# Patient Record
Sex: FEMALE | Race: WHITE | HISPANIC OR LATINO | Employment: OTHER | URBAN - METROPOLITAN AREA
[De-identification: names, ages, dates, MRNs, and addresses within clinical notes are randomized per-mention and may not be internally consistent; named-entity substitution may affect disease eponyms.]

---

## 2022-07-31 ENCOUNTER — HOSPITAL ENCOUNTER (EMERGENCY)
Facility: CLINIC | Age: 42
Discharge: HOME | End: 2022-07-31
Attending: EMERGENCY MEDICINE | Admitting: EMERGENCY MEDICINE
Payer: MEDICARE

## 2022-07-31 VITALS
DIASTOLIC BLOOD PRESSURE: 101 MMHG | OXYGEN SATURATION: 100 % | RESPIRATION RATE: 14 BRPM | HEART RATE: 98 BPM | TEMPERATURE: 97.6 F | SYSTOLIC BLOOD PRESSURE: 147 MMHG

## 2022-07-31 DIAGNOSIS — Z86.69 HISTORY OF EPILEPSY: ICD-10-CM

## 2022-07-31 DIAGNOSIS — R56.9 SEIZURE (H): ICD-10-CM

## 2022-07-31 PROCEDURE — 250N000013 HC RX MED GY IP 250 OP 250 PS 637: Performed by: EMERGENCY MEDICINE

## 2022-07-31 PROCEDURE — 99283 EMERGENCY DEPT VISIT LOW MDM: CPT

## 2022-07-31 RX ORDER — LEVETIRACETAM 500 MG/1
2000 TABLET ORAL ONCE
Status: DISCONTINUED | OUTPATIENT
Start: 2022-07-31 | End: 2022-07-31

## 2022-07-31 RX ORDER — ZONISAMIDE 100 MG/1
300 CAPSULE ORAL ONCE
Status: COMPLETED | OUTPATIENT
Start: 2022-07-31 | End: 2022-07-31

## 2022-07-31 RX ORDER — LEVETIRACETAM 500 MG/1
1000 TABLET ORAL ONCE
Status: COMPLETED | OUTPATIENT
Start: 2022-07-31 | End: 2022-07-31

## 2022-07-31 RX ADMIN — LEVETIRACETAM 1000 MG: 500 TABLET, FILM COATED ORAL at 00:55

## 2022-07-31 RX ADMIN — ZONISAMIDE 300 MG: 100 CAPSULE ORAL at 00:56

## 2022-07-31 NOTE — ED TRIAGE NOTES
[place], per EMS report patient found to be having seizure like activity in parking lot. EMS reports that pt was postictal on scene. Pt has seizure history. BS 90. VSS on route.

## 2022-07-31 NOTE — ED PROVIDER NOTES
History   Chief Complaint:  Seizure     The history is provided by the patient, the EMS personnel and medical records.      Ev Ricks is a 42 year old female with history of kidney disease, kidney stones, epilepsy, COVID-19 infection, migraine, and reactive airway disease who presents with seizure. EMS report the patient was found having seizure-like activity in the airport parking ramp tonight. The patient was reportedly coming back from [place]. The patient reports she was visiting her son. She was reportedly tachycardic and had a blood sugar of 90 mg/dl en route. The patient states her first seizure occurred when she was 6 or 7 years old and states her last was about a year ago.  She states she is on Keppra and Zonisamide to control her seizures. She reports she has not missed any doses, but has not taken her nightly dose of Keppra yet as she was flying back home to [place].  The patient reports she has a history of kidney stones. She notes she had her ovaries removed. The patient reports she lives with her 11 and 12 year old children. She states she does drink alcohol but only on rare occasions.  The patient reports she has no fever, cough, or other symptoms. She states she feels okay at this time and would like to go home.     Review of Systems   All other systems reviewed and are negative.      Allergies:  Prednisone    Medications:  Keppra   Zonisamide  Diastat  Albuterol inhaler  Flomax  Flovent inhaler  DSS  Zofran  Fioricet    Past Medical History:     Kidney stones  Chronic migraine  Reactive airway disease  Generalized convulsive epilepsy  Kidney disease  COVID-19 infection    Past Surgical History:    Right shoulder surgery  Cholecystectomy   Bilateral kidney surgery  Partial hysterectomy  BSO  Skin graft  Breast reduction  Lithotripsy  Ureteral stent placement    Social History:  The patient presents to the ED alone  PCP: Dorina Manrique MD  [facility] is where she  "goes for primary care  The patient reports she lives in [place] with her 11 and 12 year old children    Physical Exam     Patient Vitals for the past 24 hrs:   BP Temp Temp src Pulse Resp SpO2   07/31/22 0100 -- -- -- -- -- 100 %   07/31/22 0045 -- -- -- -- -- 100 %   07/31/22 0030 -- -- -- -- 14 100 %   07/31/22 0025 -- -- -- -- 21 100 %   07/31/22 0020 -- 97.6  F (36.4  C) Temporal -- (!) 32 100 %   07/31/22 0015 (!) 147/101 97.6  F (36.4  C) Temporal 98 18 100 %     Physical Exam  General: Nontoxic-appearing woman sitting upright in room 7  HENT: mucous membranes moist, no tongue injury, face and skull nontender  CV: rate as above, regular rhythm, no murmur audible  Resp: normal effort, speaks in full phrases, no stridor, no cough observed  GI: abdomen soft and nontender, no guarding  MSK: no bony tenderness  Skin: appropriately warm and dry  Neuro: awake, alert, clear speech, fully oriented, face symmetric,  normal, strength and sensation intact in all extr, no nuchal rigidity, ambulatory  Psych: cooperative, no apparent hallucinations    Emergency Department Course     Emergency Department Course:     Reviewed:  I reviewed nursing notes, vitals, past medical history and Care Everywhere    Assessments:  0013 I obtained history and examined the patient as noted above.     Interventions:  Medications   levETIRAcetam (KEPPRA) tablet 1,000 mg (1,000 mg Oral Given 7/31/22 0055)   zonisamide (ZONEGRAN) capsule 300 mg (300 mg Oral Given 7/31/22 0056)     Disposition:  The patient was discharged to home.     Impression & Plan   Medical Decision Making:  Unfortunately do not have access to any detailed description of what a bystander saw, the paramedics state they were simply told she was having \"seizure activity\", which did resolve without pharmacologic intervention after which she was confused and this steadily improved.  This is certainly highly suggestive of a true seizure, and she does have a seizure " disorder.  Fortunately, she has recovered back to her baseline and feels well.  A dose of her oral antiepileptic medication was provided here in the ED.  She not had any recurrent seizure activity.  No signs of intoxication or withdrawal state.  No focal neurologic signs or symptoms.  She requested discharge which I did feel was reasonable.  She should not drive in the near term until cleared by her neurologist.  In the absence of preceding or subsequent or persistent symptoms, I do not think that further emergent work-up such as blood tests is absolutely necessary, and she wished to minimize any diagnostic work-up.  Highly doubt meningitis or intracranial hemorrhage or tumor.  Return for sudden worsening at any hour.    Diagnosis:    ICD-10-CM    1. Seizure (H)  R56.9     highly suspected   2. History of epilepsy  Z86.69      Scribe Disclosure:  ABIGAIL, Branden Starr, am serving as a scribe at 12:18 AM on 7/31/2022 to document services personally performed by Davis Castaneda MD based on my observations and the provider's statements to me.        Davis Castaneda MD  07/31/22 4547

## 2022-07-31 NOTE — ED NOTES
Bed: ED07  Expected date:   Expected time:   Means of arrival:   Comments:  [address] 42F seizure